# Patient Record
Sex: MALE | Race: WHITE | NOT HISPANIC OR LATINO | Employment: OTHER | URBAN - METROPOLITAN AREA
[De-identification: names, ages, dates, MRNs, and addresses within clinical notes are randomized per-mention and may not be internally consistent; named-entity substitution may affect disease eponyms.]

---

## 2020-10-12 ENCOUNTER — TELEPHONE (OUTPATIENT)
Dept: UROLOGY | Facility: CLINIC | Age: 50
End: 2020-10-12

## 2020-10-12 NOTE — TELEPHONE ENCOUNTER
"[facility] Call Center    Phone Message    May a detailed message be left on voicemail: yes     Reason for Call: Other: Klever saw Dr. Mims back in 2014 for hematuria he was having. Billie is not currently having hematuria, but he would like to follow up with an urologist to make sure everything is working \"down there\". Klever would be interested in taking one of the new patient slots for Dr. Rasheed on 11/25/20. Per clinic protocols, it is recommended an encounter be sent over for review in regards to hematuria. Please give Klever a call back at your earliest convenience to discuss.     Action Taken: Message routed to:  [facility]  U[facility]    Travel Screening: Not Applicable                                                                      "

## 2020-10-13 NOTE — TELEPHONE ENCOUNTER
[facility] Call Center    Phone Message    May a detailed message be left on voicemail: yes     Reason for Call: Other: Klever calling back again today (10/13/20) to check on the message that was left for him yesterday (10/12/20). Klever would like to get an update from the care team regarding seeing Dr. Rasheed. Please refer to the first encounter dated 10/12/20 for more details. Please give Klever a call back at your earliest convenience.     Action Taken: Message routed to:  [facility]  Uro    Travel Screening: Not Applicable

## 2020-10-14 NOTE — TELEPHONE ENCOUNTER
Spoke to patient. Scheduled visit with Dolores Holt on 11/24 in clinic as patient want to be examine.

## 2020-10-26 ENCOUNTER — DOCUMENTATION ONLY (OUTPATIENT)
Dept: CARE COORDINATION | Facility: CLINIC | Age: 50
End: 2020-10-26

## 2020-11-01 NOTE — TELEPHONE ENCOUNTER
MEDICAL RECORDS REQUEST   [facility]  Urology Clinic  [address] [place] [address]  PHONE: [phone]  Fax: [phone]        FUTURE VISIT INFORMATION                                                   Klever Wall, : 1970 scheduled for future visit at [facility]    APPOINTMENT INFORMATION:    Date: 20 8:30AM    Provider:  Dolores Holt RN    Reason for Visit/Diagnosis: Hematuria     REFERRAL INFORMATION:    Referring provider:  Self    Specialty: N/A    Referring providers clinic:  N/A    Clinic contact number:  N/A    RECORDS REQUESTED FOR VISIT                                                     NOTES  STATUS/DETAILS   OFFICE NOTE from referring provider  no   OFFICE NOTE from other specialist  no   DISCHARGE SUMMARY from hospital  no   DISCHARGE REPORT from the ER  no   OPERATIVE REPORT  no   MEDICATION LIST  no   LABS     URINALYSIS (UA)  yes     PRE-VISIT CHECKLIST      Record collection complete Yes   Appointment appropriately scheduled           (right time/right provider) Yes   MyChart activation If no, please explain: In process    Questionnaire complete If no, please explain: In process      Completed by: Libra Chaidez

## 2020-11-17 ENCOUNTER — PRE VISIT (OUTPATIENT)
Dept: UROLOGY | Facility: CLINIC | Age: 50
End: 2020-11-17

## 2020-11-17 NOTE — TELEPHONE ENCOUNTER
Reason for visit: hematuria work up    Relevant information: pt is self referred    Records/imaging/labs/orders: pt saw Dr. Mims in 2014    Pt called: no need for a call    At Rooming: collect a urine

## 2020-11-24 ENCOUNTER — VIRTUAL VISIT (OUTPATIENT)
Dept: UROLOGY | Facility: CLINIC | Age: 50
End: 2020-11-24
Payer: COMMERCIAL

## 2020-11-24 ENCOUNTER — PRE VISIT (OUTPATIENT)
Dept: UROLOGY | Facility: CLINIC | Age: 50
End: 2020-11-24

## 2020-11-24 DIAGNOSIS — Z87.448 HISTORY OF HEMATURIA: Primary | ICD-10-CM

## 2020-11-24 PROCEDURE — 99202 OFFICE O/P NEW SF 15 MIN: CPT | Mod: 95 | Performed by: NURSE PRACTITIONER

## 2020-11-24 ASSESSMENT — PAIN SCALES - GENERAL: PAINLEVEL: NO PAIN (0)

## 2020-11-24 NOTE — PROGRESS NOTES
"Klever Wall is a 50 year old male who is being evaluated via a billable video visit.      The patient has been notified of following:     \"This video visit will be conducted via a call between you and your physician/provider. We have found that certain health care needs can be provided without the need for an in-person physical exam.  This service lets us provide the care you need with a video conversation.  If a prescription is necessary we can send it directly to your pharmacy.  If lab work is needed we can place an order for that and you can then stop by our lab to have the test done at a later time.    Video visits are billed at different rates depending on your insurance coverage.  Please reach out to your insurance provider with any questions.    If during the course of the call the physician/provider feels a video visit is not appropriate, you will not be charged for this service.\"    Patient has given verbal consent for Video visit? Yes  How would you like to obtain your AVS? Mail a copy  If you are dropped from the video visit, the video invite should be resent to: Send to e-mail at: [email or web address]  Will anyone else be joining your video visit? No      Video Visit Technology for this patient: Omn[employer] Video Visit- Patient was left in waiting room      Klever Wall complains of   Chief Complaint   Patient presents with     Consult     Microscopic hematuria work up       50 year old male who presents today via virtual visit regarding hematuria. Was previously evaluated by Dr. Mims in 2014 for microscopic hematuria. Workup at that time negative; therefore, cystoscopy was deferred.     Today, he states that he needs an updated physical for the [employer], as he is a recreational . As a part of this, he needs to be reevaluated by urology for his history of microhematuria. States that his UAs always show a \"trace\" of blood. Denies any urinary issues, speccificallydysuria, pyuria, hesitancy, " intermittency, feelings of incomplete emptying, or any recent history of urinary tract infections or stones. Denies any gross hematuria.      Exam:   Patient is a 50 year old male   General Appearance: Well groomed, hygenic  Respiratory: No cough, no respiratory distress or labored breathing  Musculoskeletal: Grossly normal with no gross deficits  Skin: No discoloration or apparent rashes  Neurologic: No tremors  Psychiatric: Alert and oriented  Further examination is deferred due to the nature of our visit.       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 Denies any gross hematuria or urinary complaints.  -Will update a UA/UC, as well as a urine cytology.  -If UA negative, will defer further workup. If positive, could pursue urinary tract imaging.           Video-Visit Details    Type of service:  Video Visit    Video Start Time: 8:35 PM  Video End Time: 8:50 AM    Originating Location (pt. Location): Home    Distant Location (provider location):  [facility] UROLOG[facility] CLINIC [place]     Platform used for Video Visit: Liam Holt, CNP

## 2020-11-24 NOTE — PATIENT INSTRUCTIONS
UROLOGY CLINIC VISIT PATIENT INSTRUCTIONS    -Will update a UA/UC and urine cytology at the lab.  -I will be in contact with you regarding results, once complete.    If you have any issues, questions or concerns in the meantime, do not hesitate to contact us at [phone] or via Little1.     It was a pleasure meeting with you today.  Thank you for allowing me and my team the privilege of caring for you today.  YOU are the reason we are here, and I truly hope we provided you with the excellent service you deserve.  Please let us know if there is anything else we can do for you so that we can be sure you are leaving completely satisfied with your care experience.    Dolores Holt, CNP

## 2020-11-24 NOTE — NURSING NOTE
Chief Complaint   Patient presents with     Consult     Microscopic hematuria work up       There were no vitals taken for this visit. There is no height or weight on file to calculate BMI.    Patient Active Problem List   Diagnosis     Microscopic hematuria       No Known Allergies    No current outpatient medications on file.       Social History     Tobacco Use     Smoking status: Never Smoker     Smokeless tobacco: Former User   Substance Use Topics     Alcohol use: None     Drug use: None       Carie Toledo CMA  11/24/2020  8:31 AM

## 2020-11-24 NOTE — LETTER
"11/24/2020       RE: Klever Wall  [address] [address]     Dear Colleague,    Thank you for referring your patient, Klever Wall, to the [facility] [place] [facility]. Please see a copy of my visit note below.    Klever Wall is a 50 year old male who is being evaluated via a billable video visit.      The patient has been notified of following:     \"This video visit will be conducted via a call between you and your physician/provider. We have found that certain health care needs can be provided without the need for an in-person physical exam.  This service lets us provide the care you need with a video conversation.  If a prescription is necessary we can send it directly to your pharmacy.  If lab work is needed we can place an order for that and you can then stop by our lab to have the test done at a later time.    Video visits are billed at different rates depending on your insurance coverage.  Please reach out to your insurance provider with any questions.    If during the course of the call the physician/provider feels a video visit is not appropriate, you will not be charged for this service.\"    Patient has given verbal consent for Video visit? Yes  How would you like to obtain your AVS? Mail a copy  If you are dropped from the video visit, the video invite should be resent to: Send to e-mail at: [email or web address]  Will anyone else be joining your video visit? No      Video Visit Technology for this patient: [employer] Video Visit- Patient was left in waiting room      Klever Wall complains of   Chief Complaint   Patient presents with     Consult     Microscopic hematuria work up       50 year old male who presents today via virtual visit regarding hematuria. Was previously evaluated by Dr. Mims in 2014 for microscopic hematuria. Workup at that time negative; therefore, cystoscopy was deferred. 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 As a part of this, he needs to be reevaluated by urology for his history of microhematuria. States that his UAs always show a \"trace\" of blood. Denies any urinary issues, speccificallydysuria, pyuria, hesitancy, intermittency, feelings of incomplete emptying, or any recent history of urinary tract infections or stones. Denies any gross hematuria.      Exam:   Patient is a 50 year old male   General Appearance: Well groomed, hygenic  Respiratory: No cough, no respiratory distress or labored breathing  Musculoskeletal: Grossly normal with no gross deficits  Skin: No discoloration or apparent rashes  Neurologic: No tremors  Psychiatric: Alert and oriented  Further examination is deferred due to the nature of our visit.       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 Denies any gross hematuria or urinary complaints.  -Will update a UA/UC, as well as a urine cytology.  -If UA negative, will defer further workup. If positive, could pursue urinary tract imaging.           Video-Visit Details    Type of service:  Video Visit    Video Start Time: 8:35 PM  Video End Time: 8:50 AM    Originating Location (pt. Location): Home    Distant Location (provider location):  [facility] [place]     Platform used for Video Visit: Liam Hotl, CNP  "

## 2020-11-25 ENCOUNTER — TELEPHONE (OUTPATIENT)
Dept: UROLOGY | Facility: CLINIC | Age: 50
End: 2020-11-25

## 2020-11-25 NOTE — TELEPHONE ENCOUNTER
Called Edwin who stated that they were choosing not to complete UA/UC or cytology. Patient stated that they did not desire to continue workup for their hematuria at this time. I assured patient that he would be going against his provider's professional recommendation as well as AUA guidelines for hematuria. Patient stated their understanding. Orders for UA/UC and cytology will stay valid for one year if patient wishes to continue care. This information was shared with patient.    Joe Merchant, EMT

## 2023-03-30 ENCOUNTER — OFFICE VISIT (OUTPATIENT)
Dept: CARDIOLOGY | Facility: CLINIC | Age: 53
End: 2023-03-30
Payer: COMMERCIAL

## 2023-03-30 ENCOUNTER — LAB (OUTPATIENT)
Dept: LAB | Facility: CLINIC | Age: 53
End: 2023-03-30
Payer: COMMERCIAL

## 2023-03-30 VITALS
HEIGHT: 70 IN | HEART RATE: 76 BPM | SYSTOLIC BLOOD PRESSURE: 130 MMHG | DIASTOLIC BLOOD PRESSURE: 72 MMHG | OXYGEN SATURATION: 94 % | WEIGHT: 173.6 LBS | BODY MASS INDEX: 24.85 KG/M2

## 2023-03-30 DIAGNOSIS — Z82.49 FAMILY HISTORY OF ISCHEMIC HEART DISEASE: ICD-10-CM

## 2023-03-30 DIAGNOSIS — R06.02 SHORTNESS OF BREATH ON EXERTION: ICD-10-CM

## 2023-03-30 DIAGNOSIS — Z82.49 FAMILY HISTORY OF ISCHEMIC HEART DISEASE: Primary | ICD-10-CM

## 2023-03-30 DIAGNOSIS — Z82.49 FAMILY HISTORY OF VALVULAR HEART DISEASE: ICD-10-CM

## 2023-03-30 LAB
ANION GAP SERPL CALCULATED.3IONS-SCNC: 9 MMOL/L (ref 7–15)
BUN SERPL-MCNC: 7.2 MG/DL (ref 6–20)
CALCIUM SERPL-MCNC: 9.5 MG/DL (ref 8.6–10)
CHLORIDE SERPL-SCNC: 104 MMOL/L (ref 98–107)
CHOLEST SERPL-MCNC: 208 MG/DL
CREAT SERPL-MCNC: 0.87 MG/DL (ref 0.67–1.17)
DEPRECATED HCO3 PLAS-SCNC: 27 MMOL/L (ref 22–29)
GFR SERPL CREATININE-BSD FRML MDRD: >90 ML/MIN/1.73M2
GLUCOSE SERPL-MCNC: 100 MG/DL (ref 70–99)
HDLC SERPL-MCNC: 69 MG/DL
LDLC SERPL CALC-MCNC: 124 MG/DL
NONHDLC SERPL-MCNC: 139 MG/DL
POTASSIUM SERPL-SCNC: 4.5 MMOL/L (ref 3.4–5.3)
SODIUM SERPL-SCNC: 140 MMOL/L (ref 136–145)
TRIGL SERPL-MCNC: 76 MG/DL

## 2023-03-30 PROCEDURE — 36415 COLL VENOUS BLD VENIPUNCTURE: CPT | Performed by: INTERNAL MEDICINE

## 2023-03-30 PROCEDURE — 99204 OFFICE O/P NEW MOD 45 MIN: CPT | Performed by: INTERNAL MEDICINE

## 2023-03-30 PROCEDURE — 80048 BASIC METABOLIC PNL TOTAL CA: CPT | Performed by: INTERNAL MEDICINE

## 2023-03-30 PROCEDURE — 80061 LIPID PANEL: CPT | Performed by: INTERNAL MEDICINE

## 2023-03-30 PROCEDURE — 93000 ELECTROCARDIOGRAM COMPLETE: CPT | Performed by: INTERNAL MEDICINE

## 2023-03-30 NOTE — PROGRESS NOTES
"HPI and Plan:   See dictation          Orders Placed This Encounter   Procedures     Lipid Profile     Basic metabolic panel     Follow-Up with Cardiology     EKG 12-lead complete w/read - Clinics (performed today)     Stress Test - Adult     Echocardiogram Complete       No orders of the defined types were placed in this encounter.      There are no discontinued medications.      Encounter Diagnoses   Name Primary?     Family history of ischemic heart disease Yes     Shortness of breath on exertion      Family history of valvular heart disease        CURRENT MEDICATIONS:  No current outpatient medications on file.       ALLERGIES   No Known Allergies    PAST MEDICAL HISTORY:  History reviewed. No pertinent past medical history.    PAST SURGICAL HISTORY:  History reviewed. No pertinent surgical history.    FAMILY HISTORY:  Family History   Problem Relation Age of Onset     Obesity Mother      Colon Cancer Maternal Grandfather      Heart Murmur Brother        SOCIAL HISTORY:  Social History     Socioeconomic History     Marital status: Single     Spouse name: None     Number of children: None     Years of education: None     Highest education level: None   Tobacco Use     Smoking status: Never     Smokeless tobacco: Former     Quit date: 1992   Substance and Sexual Activity     Alcohol use: Yes     Comment: occ       Review of Systems:  Skin:          Eyes:         ENT:         Respiratory:          Cardiovascular:         Gastroenterology:        Genitourinary:         Musculoskeletal:         Neurologic:         Psychiatric:         Heme/Lymph/Imm:         Endocrine:           Physical Exam:  Vitals: /72   Pulse 76   Ht 1.778 m (5' 10\")   Wt 78.7 kg (173 lb 9.6 oz)   SpO2 94%   BMI 24.91 kg/m      Constitutional:  cooperative, alert and oriented, well developed, well nourished, in no acute distress thin      Skin:  warm and dry to the touch          Head:  no masses or lesions        Eyes:  pupils equal " and round        Lymph:No Cervical lymphadenopathy present     ENT:  no pallor or cyanosis        Neck:  carotid pulses are full and equal bilaterally, JVP normal, no carotid bruit        Respiratory:  normal breath sounds, clear to auscultation, normal A-P diameter, normal symmetry, normal respiratory excursion, no use of accessory muscles         Cardiac: regular rhythm;normal S1 and S2;no S3 or S4;no murmurs, gallops or rubs detected                pulses full and equal, no bruits auscultated                                        GI:  not assessed this visit        Extremities and Muscular Skeletal:  no deformities, clubbing, cyanosis, erythema observed;no edema              Neurological:  no gross motor deficits;affect appropriate        Psych:  Alert and Oriented x 3        CC  Referred Self, MD  No address on file

## 2023-03-30 NOTE — LETTER
"3/30/2023    Physician No Ref-Primary  No address on file    RE: Klever Wall       Dear Colleague,     I had the pleasure of seeing Klever Wall in the [facility].  HPI and Plan:   See dictation          Orders Placed This Encounter   Procedures     Lipid Profile     Basic metabolic panel     Follow-Up with Cardiology     EKG 12-lead complete w/read - Clinics (performed today)     Stress Test - Adult     Echocardiogram Complete       No orders of the defined types were placed in this encounter.      There are no discontinued medications.      Encounter Diagnoses   Name Primary?     Family history of ischemic heart disease Yes     Shortness of breath on exertion      Family history of valvular heart disease        CURRENT MEDICATIONS:  No current outpatient medications on file.       ALLERGIES   No Known Allergies    PAST MEDICAL HISTORY:  History reviewed. No pertinent past medical history.    PAST SURGICAL HISTORY:  History reviewed. No pertinent surgical history.    FAMILY HISTORY:  Family History   Problem Relation Age of Onset     Obesity Mother      Colon Cancer Maternal Grandfather      Heart Murmur Brother        SOCIAL HISTORY:  Social History     Socioeconomic History     Marital status: Single     Spouse name: None     Number of children: None     Years of education: None     Highest education level: None   Tobacco Use     Smoking status: Never     Smokeless tobacco: Former     Quit date: 1992   Substance and Sexual Activity     Alcohol use: Yes     Comment: occ       Review of Systems:  Skin:          Eyes:         ENT:         Respiratory:          Cardiovascular:         Gastroenterology:        Genitourinary:         Musculoskeletal:         Neurologic:         Psychiatric:         Heme/Lymph/Imm:         Endocrine:           Physical Exam:  Vitals: /72   Pulse 76   Ht 1.778 m (5' 10\")   Wt 78.7 kg (173 lb 9.6 oz)   SpO2 94%   BMI 24.91 kg/m  "     Constitutional:  cooperative, alert and oriented, well developed, well nourished, in no acute distress thin      Skin:  warm and dry to the touch          Head:  no masses or lesions        Eyes:  pupils equal and round        Lymph:No Cervical lymphadenopathy present     ENT:  no pallor or cyanosis        Neck:  carotid pulses are full and equal bilaterally, JVP normal, no carotid bruit        Respiratory:  normal breath sounds, clear to auscultation, normal A-P diameter, normal symmetry, normal respiratory excursion, no use of accessory muscles         Cardiac: regular rhythm;normal S1 and S2;no S3 or S4;no murmurs, gallops or rubs detected                pulses full and equal, no bruits auscultated                                        GI:  not assessed this visit        Extremities and Muscular Skeletal:  no deformities, clubbing, cyanosis, erythema observed;no edema              Neurological:  no gross motor deficits;affect appropriate        Psych:  Alert and Oriented x 3        CC  Referred Self, MD  No address on file                  Thank you for allowing me to participate in the care of your patient.      Sincerely,     Alvaro Junior MD, MD     [facility]  cc:   Erinn Cheney MD  No address on file

## 2023-03-30 NOTE — LETTER
Date:March 31, 2023      Provider requested that no letter be sent. Do not send.       [facility]

## 2023-03-30 NOTE — PROGRESS NOTES
Service Date: 03/30/2023    INDICATION FOR REFERRAL:  Brother has a history of valvular heart disease with his valvular disease has been diagnosed in his 20s and having had valve replacement in his early 40s.  There is possibly a history of coronary artery disease and also shortness of breath with exertion.      HISTORY OF PRESENT ILLNESS:  Mr. Moss is a pleasant 53-year-old .  He tries to work out as much as he can.  He does a lot of weight working, but he has recently noticed over the last few months that when his bench pressing that he has become a lot more short of breath and has also become quite dizzy when he comes off the bench pressing machine.  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  In the past, this would not cause difficulty, but as I said in the last 2 months, he has clearly noticed a significant change in his symptoms.  He has not noticed chest pains or chest pressure or but mainly shortness of breath and also being quite dizzy and presyncopal.  He was sufficiently worried about this that he stopped running, which he really liked to do, because he is afraid of the symptoms that he has developed while working out on the weight machines.      His 12-lead electrocardiogram is within normal limits.  His blood pressure is well controlled at 130/72.  I do not see anywhere that he has had a lipid profile checked.  He has never had an echocardiogram despite the family history of premature valvular heart disease.      With respect to his family history of valvular heart disease, evidently his brother was diagnosed with valvular heart disease when he was quite young and maybe in his teens or 20s, and he ended up 20 years later having valve replacement.  He is not sure what valve it was, but I would suspect that his brother probably had a bicuspid aortic valve, most likely, although I cannot be 100% sure of that.    IMPRESSION:    1.  Shortness of breath on exertion.  Certainly, ischemia does need  to be ruled out as well as valvular heart disease.  2.  Family history of premature valve replacement in his brother who appears to have had valvular heart disease from his teens or early 20s and having the valve replaced in his early 40s.  3.  Normotensive.    PLAN:  Firstly, we will obtain a stress EKG to determine if there is any evidence of ischemia.      Secondly, this patient should have an echocardiogram given the family history of valvular heart disease with premature valve replacement, which almost certainly represents congenital valvular heart disease, of which most likely would be bicuspid aortic valve.  We certainly need to know if this patient has a bicuspid aortic valve as this can also be associated with aortopathy and certainly we do not want a patient bench pressing heavy weights with an aortopathy so that needs to be ruled out.    Finally, we will obtain a fasting lipid profile.      I will see the patient back again with the results of these tests.    It is my pleasure to be involved the care of this nice patient.    MD Alvaro Costa MD, [facility]        D: 2023   T: 2023   MT: MALIK    Name:     ULISSES ZHU  MRN:      [id number]        Account:      [id number]   :      1970           Service Date: 2023       Document: [id number]

## 2023-03-31 ENCOUNTER — HOSPITAL ENCOUNTER (OUTPATIENT)
Dept: CARDIOLOGY | Facility: CLINIC | Age: 53
Discharge: HOME | End: 2023-03-31
Attending: INTERNAL MEDICINE | Admitting: INTERNAL MEDICINE
Payer: COMMERCIAL

## 2023-03-31 DIAGNOSIS — Z82.49 FAMILY HISTORY OF VALVULAR HEART DISEASE: ICD-10-CM

## 2023-03-31 LAB — LVEF ECHO: NORMAL

## 2023-03-31 PROCEDURE — 93306 TTE W/DOPPLER COMPLETE: CPT

## 2023-03-31 PROCEDURE — 93306 TTE W/DOPPLER COMPLETE: CPT | Mod: 26 | Performed by: INTERNAL MEDICINE

## 2023-04-11 ENCOUNTER — TELEPHONE (OUTPATIENT)
Dept: CARDIOLOGY | Facility: CLINIC | Age: 53
End: 2023-04-11
Payer: COMMERCIAL

## 2023-04-11 NOTE — TELEPHONE ENCOUNTER
[facility] Call Center    Phone Message    May a detailed message be left on voicemail: yes     Reason for Call: Other: Pt would like a call back asap as he was laying in bed last night and felt his heart beat was pounding in his chest which felt a little unusual and felt possibly slower then normal and he is concerned and would like to discuss and he also stated his heart beat was a little high this morning     Action Taken: Message routed to:  [facility] Cardio    Travel Screening: Not Applicable

## 2023-04-17 NOTE — TELEPHONE ENCOUNTER
Patient called back, states he does not know if the symptoms he is getting is due to stress or if there is something going on with his heart.  Patient states his heart is not beating fast when he gets these palpitations but that they are beating stronger and that he is seems to be able to feel them more.  Patient does complain of rare chest pressure with this, but denies lightheadedness.  Symptoms occur more at rest and states he does not get the symptoms when he is up and about.  Patient states his heart rate is usually slow running 55 to 60 bpm.  Patient will get upcoming stress test and will follow-up based on these results.  Savage RICHARDSON

## 2023-04-24 ENCOUNTER — HOSPITAL ENCOUNTER (OUTPATIENT)
Dept: CARDIOLOGY | Facility: CLINIC | Age: 53
Discharge: HOME | End: 2023-04-24
Attending: INTERNAL MEDICINE
Payer: COMMERCIAL

## 2023-04-24 DIAGNOSIS — R06.02 SHORTNESS OF BREATH ON EXERTION: ICD-10-CM

## 2023-04-28 ENCOUNTER — OFFICE VISIT (OUTPATIENT)
Dept: CARDIOLOGY | Facility: CLINIC | Age: 53
End: 2023-04-28
Attending: INTERNAL MEDICINE
Payer: COMMERCIAL

## 2023-04-28 VITALS
BODY MASS INDEX: 24.15 KG/M2 | DIASTOLIC BLOOD PRESSURE: 80 MMHG | HEART RATE: 68 BPM | SYSTOLIC BLOOD PRESSURE: 126 MMHG | HEIGHT: 70 IN | OXYGEN SATURATION: 95 % | WEIGHT: 168.7 LBS

## 2023-04-28 DIAGNOSIS — R06.02 SHORTNESS OF BREATH ON EXERTION: ICD-10-CM

## 2023-04-28 DIAGNOSIS — Z82.49 FAMILY HISTORY OF VALVULAR HEART DISEASE: ICD-10-CM

## 2023-04-28 DIAGNOSIS — Z82.49 FAMILY HISTORY OF ISCHEMIC HEART DISEASE: ICD-10-CM

## 2023-04-28 PROCEDURE — 99213 OFFICE O/P EST LOW 20 MIN: CPT | Performed by: INTERNAL MEDICINE

## 2023-04-28 NOTE — PROGRESS NOTES
"HPI and Plan:   See dictation        No orders of the defined types were placed in this encounter.      No orders of the defined types were placed in this encounter.      There are no discontinued medications.      Encounter Diagnoses   Name Primary?     Family history of ischemic heart disease      Shortness of breath on exertion      Family history of valvular heart disease        CURRENT MEDICATIONS:  No current outpatient medications on file.       ALLERGIES   No Known Allergies    PAST MEDICAL HISTORY:  History reviewed. No pertinent past medical history.    PAST SURGICAL HISTORY:  History reviewed. No pertinent surgical history.    FAMILY HISTORY:  Family History   Problem Relation Age of Onset     Obesity Mother      Colon Cancer Maternal Grandfather      Heart Murmur Brother        SOCIAL HISTORY:  Social History     Socioeconomic History     Marital status: Single     Spouse name: None     Number of children: None     Years of education: None     Highest education level: None   Tobacco Use     Smoking status: Never     Smokeless tobacco: Former     Types: Chew     Quit date: 1992   Substance and Sexual Activity     Alcohol use: Yes     Comment: occ       Review of Systems:  Skin:          Eyes:         ENT:         Respiratory:          Cardiovascular:         Gastroenterology:        Genitourinary:         Musculoskeletal:         Neurologic:         Psychiatric:         Heme/Lymph/Imm:         Endocrine:           Physical Exam:  Vitals: /80   Pulse 68   Ht 1.778 m (5' 10\")   Wt 76.5 kg (168 lb 11.2 oz)   SpO2 95%   BMI 24.21 kg/m          CC  Alvaro Junior MD  [address]  [place] [id number]              "

## 2023-04-28 NOTE — LETTER
"4/28/2023    Physician No Ref-Primary  No address on file    RE: Klever Wall       Dear Colleague,     I had the pleasure of seeing Klever Wall in the [facility].  HPI and Plan:   See dictation        No orders of the defined types were placed in this encounter.      No orders of the defined types were placed in this encounter.      There are no discontinued medications.      Encounter Diagnoses   Name Primary?    Family history of ischemic heart disease     Shortness of breath on exertion     Family history of valvular heart disease        CURRENT MEDICATIONS:  No current outpatient medications on file.       ALLERGIES   No Known Allergies    PAST MEDICAL HISTORY:  History reviewed. No pertinent past medical history.    PAST SURGICAL HISTORY:  History reviewed. No pertinent surgical history.    FAMILY HISTORY:  Family History   Problem Relation Age of Onset    Obesity Mother     Colon Cancer Maternal Grandfather     Heart Murmur Brother        SOCIAL HISTORY:  Social History     Socioeconomic History    Marital status: Single     Spouse name: None    Number of children: None    Years of education: None    Highest education level: None   Tobacco Use    Smoking status: Never    Smokeless tobacco: Former     Types: Chew     Quit date: 1992   Substance and Sexual Activity    Alcohol use: Yes     Comment: occ       Review of Systems:  Skin:          Eyes:         ENT:         Respiratory:          Cardiovascular:         Gastroenterology:        Genitourinary:         Musculoskeletal:         Neurologic:         Psychiatric:         Heme/Lymph/Imm:         Endocrine:           Physical Exam:  Vitals: /80   Pulse 68   Ht 1.778 m (5' 10\")   Wt 76.5 kg (168 lb 11.2 oz)   SpO2 95%   BMI 24.21 kg/m            CC  Alvaro Junior MD  [address]  [place] [address]                Service Date: 04/28/2023    CARDIOLOGY FOLLOWUP VISIT    REFERRING PHYSICIAN:  I do " not see a referring physician.    HISTORY OF PRESENT ILLNESS:  Klever Wall is a very pleasant 53-year-old patient whose brother has had a history of valvular heart disease and possibly a history of coronary artery disease also.  This patient has been complaining of occasionally having shortness of breath on exertion.  I would refer you to my dictation from 4 weeks ago on 03/30/2023.  As part of our workup, we had the patient perform a stress EKG which was entirely normal.  The stress EKG showed no evidence of ischemia.  He was able to exercise up to 12 minutes which is excellent workload.  No arrhythmias and no chest discomfort were noted.  His Duke score was entirely normal at 12 which is low risk.  Echocardiography, firstly, showed that he has a tricuspid aortic valve, so he has not inherited his brother's bicuspid valve.  Also, he has no significant valvular heart disease and I did explain to him that trace of mitral regurgitation and mild tricuspid regurgitation is probably within normal limits.  His ejection fraction was normal.  He was wondering why his ejection fraction was not 100% and I explained to him that 55 and above is fully normal.  His diastolic function is also normal.  Pressures in the right side of the heart could not be measured.  His filling pressures are all normal.  TAPSE is also normal.  So, it does appear that the patient, firstly, has not inherited a bicuspid aortic valve and there is no evidence of ischemia at all.  His chest discomfort also did sound atypical.    IMPRESSION:    1.  No evidence of a bicuspid aortic valve.  Aortic valve is clearly tricuspid.  2.  Chest discomfort.  No evidence of ischemia on stress testing.    PLAN:  At this particular time, I would just observe and he can see us back on a p.r.n. basis.  I reassured the patient that he was doing well and that the probability of serious cardiac issues is low.    Alvaro Trotter MD, [facility]        D: 04/28/2023   T:  2023   MT: [email or web address]    Name:     ULISSES ZHU  MRN:      [id number]        Account:      [id number]   :      1970           Service Date: 2023       Document: [id number]    Thank you for allowing me to participate in the care of your patient.      Sincerely,     Alvaro Junior MD, MD     [facility]  cc:   Alvaro Junior MD  [address] [facility]  [place] [address]

## 2023-04-28 NOTE — PROGRESS NOTES
Service Date: 04/28/2023    CARDIOLOGY FOLLOWUP VISIT    REFERRING PHYSICIAN:  I do not see a referring physician.    HISTORY OF PRESENT ILLNESS:  Klever Wall is a very pleasant 53-year-old patient whose brother has had a history of valvular heart disease and possibly a history of coronary artery disease also.  This patient has been complaining of occasionally having shortness of breath on exertion.  I would refer you to my dictation from 4 weeks ago on 03/30/2023.  As part of our workup, we had the patient perform a stress EKG which was entirely normal.  The stress EKG showed no evidence of ischemia.  He was able to exercise up to 12 minutes which is excellent workload.  No arrhythmias and no chest discomfort were noted.  His Duke score was entirely normal at 12 which is low risk.  Echocardiography, firstly, showed that he has a tricuspid aortic valve, so he has not inherited his brother's bicuspid valve.  Also, he has no significant valvular heart disease and I did explain to him that trace of mitral regurgitation and mild tricuspid regurgitation is probably within normal limits.  His ejection fraction was normal.  He was wondering why his ejection fraction was not 100% and I explained to him that 55 and above is fully normal.  His diastolic function is also normal.  Pressures in the right side of the heart could not be measured.  His filling pressures are all normal.  TAPSE is also normal.  So, it does appear that the patient, firstly, has not inherited a bicuspid aortic valve and there is no evidence of ischemia at all.  His chest discomfort also did sound atypical.    IMPRESSION:    1.  No evidence of a bicuspid aortic valve.  Aortic valve is clearly tricuspid.  2.  Chest discomfort.  No evidence of ischemia on stress testing.    PLAN:  At this particular time, I would just observe and he can see us back on a p.r.n. basis.  I reassured the patient that he was doing well and that the probability of serious  cardiac issues is low.    Alvaro Trotter MD, [facility]        D: 2023   T: 2023   MT: jorge    Name:     ULISSES ZHU  MRN:      [id number]        Account:      [id number]   :      1970           Service Date: 2023       Document: [id number]

## 2023-05-10 ENCOUNTER — OFFICE VISIT (OUTPATIENT)
Dept: FAMILY MEDICINE | Facility: CLINIC | Age: 53
End: 2023-05-10
Payer: COMMERCIAL

## 2023-05-10 VITALS
TEMPERATURE: 98.2 F | OXYGEN SATURATION: 96 % | HEIGHT: 70 IN | WEIGHT: 169 LBS | SYSTOLIC BLOOD PRESSURE: 123 MMHG | RESPIRATION RATE: 20 BRPM | BODY MASS INDEX: 24.2 KG/M2 | DIASTOLIC BLOOD PRESSURE: 79 MMHG | HEART RATE: 61 BPM

## 2023-05-10 DIAGNOSIS — R00.2 PALPITATIONS: ICD-10-CM

## 2023-05-10 DIAGNOSIS — Z00.00 ROUTINE GENERAL MEDICAL EXAMINATION AT A HEALTH CARE FACILITY: Primary | ICD-10-CM

## 2023-05-10 DIAGNOSIS — N52.9 ERECTILE DYSFUNCTION, UNSPECIFIED ERECTILE DYSFUNCTION TYPE: ICD-10-CM

## 2023-05-10 LAB — HBA1C MFR BLD: 5.3 % (ref 0–5.6)

## 2023-05-10 PROCEDURE — 86803 HEPATITIS C AB TEST: CPT | Performed by: STUDENT IN AN ORGANIZED HEALTH CARE EDUCATION/TRAINING PROGRAM

## 2023-05-10 PROCEDURE — 36415 COLL VENOUS BLD VENIPUNCTURE: CPT | Performed by: STUDENT IN AN ORGANIZED HEALTH CARE EDUCATION/TRAINING PROGRAM

## 2023-05-10 PROCEDURE — 86780 TREPONEMA PALLIDUM: CPT | Performed by: STUDENT IN AN ORGANIZED HEALTH CARE EDUCATION/TRAINING PROGRAM

## 2023-05-10 PROCEDURE — 99214 OFFICE O/P EST MOD 30 MIN: CPT | Mod: 25 | Performed by: STUDENT IN AN ORGANIZED HEALTH CARE EDUCATION/TRAINING PROGRAM

## 2023-05-10 PROCEDURE — 87591 N.GONORRHOEAE DNA AMP PROB: CPT | Performed by: STUDENT IN AN ORGANIZED HEALTH CARE EDUCATION/TRAINING PROGRAM

## 2023-05-10 PROCEDURE — 99386 PREV VISIT NEW AGE 40-64: CPT | Mod: GC | Performed by: STUDENT IN AN ORGANIZED HEALTH CARE EDUCATION/TRAINING PROGRAM

## 2023-05-10 PROCEDURE — 83036 HEMOGLOBIN GLYCOSYLATED A1C: CPT | Performed by: STUDENT IN AN ORGANIZED HEALTH CARE EDUCATION/TRAINING PROGRAM

## 2023-05-10 PROCEDURE — 87491 CHLMYD TRACH DNA AMP PROBE: CPT | Performed by: STUDENT IN AN ORGANIZED HEALTH CARE EDUCATION/TRAINING PROGRAM

## 2023-05-10 PROCEDURE — 87389 HIV-1 AG W/HIV-1&-2 AB AG IA: CPT | Performed by: STUDENT IN AN ORGANIZED HEALTH CARE EDUCATION/TRAINING PROGRAM

## 2023-05-10 RX ORDER — SILDENAFIL 100 MG/1
50 TABLET, FILM COATED ORAL DAILY PRN
Qty: 30 TABLET | Refills: 1 | Status: SHIPPED | OUTPATIENT
Start: 2023-05-10 | End: 2024-01-25

## 2023-05-10 RX ORDER — SILDENAFIL 100 MG/1
50 TABLET, FILM COATED ORAL DAILY PRN
Qty: 30 TABLET | Refills: 1 | Status: SHIPPED | OUTPATIENT
Start: 2023-05-10 | End: 2023-05-10

## 2023-05-10 ASSESSMENT — ENCOUNTER SYMPTOMS
HEARTBURN: 1
NERVOUS/ANXIOUS: 1
WEAKNESS: 0
JOINT SWELLING: 0
HEMATOCHEZIA: 0
HEMATURIA: 0
NAUSEA: 0
DIZZINESS: 0
ABDOMINAL PAIN: 0
CONSTIPATION: 0
EYE PAIN: 0
PALPITATIONS: 1
CHILLS: 0
HEADACHES: 0
MYALGIAS: 0
PARESTHESIAS: 0
FREQUENCY: 0
FEVER: 0
ARTHRALGIAS: 0
DIARRHEA: 0
SHORTNESS OF BREATH: 1
DYSURIA: 0
COUGH: 0
SORE THROAT: 0

## 2023-05-10 NOTE — PROGRESS NOTES
Preceptor Attestation:    I discussed the patient with the resident and evaluated the patient in person. I have verified the content of the note, which accurately reflects my assessment of the patient and the plan of care.   Supervising Physician:  Minesh Maurer MD.

## 2023-05-10 NOTE — PROGRESS NOTES
SUBJECTIVE:   CC: Klever is an 53 year old who presents for preventative health visit.       5/10/2023    12:39 PM   Additional Questions   Roomed by clyde   Accompanied by self        Patient has been advised of split billing requirements and indicates understanding: Yes     Healthy Habits:     Getting at least 3 servings of Calcium per day:  Yes    Bi-annual eye exam:  Yes    Dental care twice a year:  NO    Sleep apnea or symptoms of sleep apnea:  None    Diet:  Regular (no restrictions)    Frequency of exercise:  4-5 days/week    Duration of exercise:  15-30 minutes    Taking medications regularly:  Yes    Medication side effects:  None    PHQ-2 Total Score: 0    Additional concerns today:  Yes    Erectile Dysfunction:  Onset: chronic, years  Difficulty obtaining erection with partner: yes  Difficulty obtaining erection during masturbation or sleep: declined  Difficulty sustaining erection: yes - chief issue  Difficulty ejaculating: no  Known history of:  Cardiovascular disease: no; had negative stress test & echo recently; only mildly elevated lipids  Diabetes: no  Tobacco use: no  Endocrine disorder: no  Neurologic disorder: no  Other genital disorder: no  Psychiatric condition: some anxiety  Notable medications & recent changes: none    Today's PHQ-2 Score:       5/10/2023    12:40 PM   PHQ-2 ( 1999 [employer])   Q1: Little interest or pleasure in doing things 0   Q2: Feeling down, depressed or hopeless 0   PHQ-2 Score 0     Have you ever done Advance Care Planning? (For example, a Health Directive, POLST, or a discussion with a medical provider or your loved ones about your wishes): No, advance care planning information given to patient to review.  Advanced care planning was discussed at today's visit.    Social History     Tobacco Use     Smoking status: Never     Smokeless tobacco: Former     Types: Chew     Quit date: 1992   Vaping Use     Vaping status: Not on file   Substance Use Topics     Alcohol use: Yes  "    Comment: occ             5/10/2023    12:34 PM   Alcohol Use   Prescreen: >3 drinks/day or >7 drinks/week? No       Last PSA: No results found for: PSA    Reviewed orders with patient. Reviewed health maintenance and updated orders accordingly - Yes  Labs reviewed in EPIC    Reviewed and updated as needed this visit by clinical staff   Tobacco                Reviewed and updated as needed this visit by Provider                   Review of Systems   Constitutional: Negative for chills and fever.   HENT: Negative for congestion, ear pain, hearing loss and sore throat.    Eyes: Negative for pain and visual disturbance.   Respiratory: Positive for shortness of breath. Negative for cough.    Cardiovascular: Positive for palpitations. Negative for chest pain and peripheral edema.   Gastrointestinal: Positive for heartburn. Negative for abdominal pain, constipation, diarrhea, hematochezia and nausea.   Genitourinary: Negative for dysuria, frequency, genital sores, hematuria and urgency.   Musculoskeletal: Negative for arthralgias, joint swelling and myalgias.   Skin: Negative for rash.   Neurological: Negative for dizziness, weakness, headaches and paresthesias.   Psychiatric/Behavioral: Negative for mood changes. The patient is nervous/anxious.        OBJECTIVE:   /79 (BP Location: Right arm, Patient Position: Sitting, Cuff Size: Adult Regular)   Pulse 61   Temp 98.2  F (36.8  C) (Oral)   Resp 20   Ht 1.778 m (5' 10\")   Wt 76.7 kg (169 lb)   SpO2 96%   BMI 24.25 kg/m      Physical Exam  GENERAL: healthy, alert and no distress  EYES: Eyes grossly normal to inspection, PERRL and conjunctivae and sclerae normal  HENT: ear canals and TM's normal, nose and mouth without ulcers or lesions  NECK: no adenopathy, no asymmetry, masses, or scars and thyroid normal to palpation  RESP: lungs clear to auscultation - no rales, rhonchi or wheezes  CV: regular rate and rhythm, normal S1 S2, no S3 or S4, no murmur, click " or rub, no peripheral edema and peripheral pulses strong  ABDOMEN: soft, nontender, no hepatosplenomegaly, no masses and bowel sounds normal  MS: no gross musculoskeletal defects noted, no edema  SKIN: no suspicious lesions or rashes  NEURO: Normal strength and tone, mentation intact and speech normal  PSYCH: mentation appears normal, affect anxious  LYMPH: no cervical adenopathy    Diagnostic Test Results:  Labs reviewed in Epic  Results for orders placed or performed in visit on 05/10/23 (from the past 24 hour(s))   Hemoglobin A1c   Result Value Ref Range    Hemoglobin A1C 5.3 0.0 - 5.6 %   Treponema Abs w Reflex to RPR and Titer   Result Value Ref Range    Treponema Antibody Total Nonreactive Nonreactive   HIV Antigen Antibody Combo   Result Value Ref Range    HIV Antigen Antibody Combo Nonreactive Nonreactive   Hepatitis C antibody   Result Value Ref Range    Hepatitis C Antibody Nonreactive Nonreactive    Narrative    Assay performance characteristics have not been established for newborns, infants, and children.       ASSESSMENT/PLAN:   Klever was seen today for physical.    Diagnoses and all orders for this visit:    Routine general medical examination at a health care facility  Lung cancer screening: not indicated - never smoker  Colon cancer screening: no prior screening; accepted FIT test   DEXA scan: not indicated by age  Diabetes screening: no prior screening; A1C of 5.3 today  Lipid screening: not due; last lipid panel 3/30/23 above desired range; 10 year ASCVD of 3.4%; discussed Mediterranean diet  PSA: not indicated by age; had   Asymptomatic STI screening: labs listed below; will contact with results once completed  -     Fecal colorectal cancer screen FIT  -     Hemoglobin A1c  -     Neisseria gonorrhoeae PCR  -     Chlamydia trachomatis PCR  -     Treponema Abs w Reflex to RPR and Titer  -     HIV Antigen Antibody Combo  -     Hepatitis C antibody    Erectile dysfunction, unspecified erectile  dysfunction type  Patient has difficulty obtaining and more so maintaining erection without any known cardiovascular, endocrine, or neurologic disorder. Does have some anxiety, but notes ED preceded that. Will trial sildenafil with follow up in 1-2 months; discussed potential side effects.  -     sildenafil (VIAGRA) 100 MG tablet; Take 0.5 tablets (50 mg) by mouth daily as needed (intercourse)    Palpitations  Has intermittent palpitations, sometimes with chest pain and dyspnea. Had evaluation with cardiology in past few weeks notable for normal exercise stress test and echo. He had questions regarding these tests, so reviewed in detail. No history of smoking, chronic cough, wheezing, or other exposure to suggest asthma/COPD. These symptoms do correlate with increased life stressors, suggesting possible anxiety induced. Recommended to follow up for this.      Patient has been advised of split billing requirements and indicates understanding: Yes      COUNSELING:   Reviewed preventive health counseling, as reflected in patient instructions       Regular exercise       Healthy diet/nutrition       Alcohol Use        Safe sex practices/STD prevention       Consider Hep C screening for all patients one time for ages 18-79 years       HIV screeninx in teen years, 1x in adult years, and at intervals if high risk       Colorectal cancer screening       Osteoporosis prevention/bone health         The 10-year ASCVD risk score (Zaynab MCKEE, et al., 2019) is: 3.4%    Values used to calculate the score:      Age: 53 years      Sex: Male      Is Non- : No      Diabetic: No      Tobacco smoker: No      Systolic Blood Pressure: 123 mmHg      Is BP treated: No      HDL Cholesterol: 69 mg/dL      Total Cholesterol: 208 mg/dL        He reports that he has never smoked. He quit smokeless tobacco use about 31 years ago.  His smokeless tobacco use included chew.      Patient staffed with attending provider   Erasto.    Josh Burden MD PGY3  [facility]

## 2023-05-11 PROBLEM — N52.9 ERECTILE DYSFUNCTION, UNSPECIFIED ERECTILE DYSFUNCTION TYPE: Status: ACTIVE | Noted: 2023-05-11

## 2023-05-11 PROBLEM — R00.2 PALPITATIONS: Status: ACTIVE | Noted: 2023-05-11

## 2023-05-11 LAB
C TRACH DNA SPEC QL NAA+PROBE: NEGATIVE
HCV AB SERPL QL IA: NONREACTIVE
HIV 1+2 AB+HIV1 P24 AG SERPL QL IA: NONREACTIVE
N GONORRHOEA DNA SPEC QL NAA+PROBE: NEGATIVE
T PALLIDUM AB SER QL: NONREACTIVE

## 2023-09-26 ENCOUNTER — TELEPHONE (OUTPATIENT)
Dept: CARDIOLOGY | Facility: CLINIC | Age: 53
End: 2023-09-26
Payer: COMMERCIAL

## 2023-09-26 DIAGNOSIS — R06.02 SHORTNESS OF BREATH ON EXERTION: Primary | ICD-10-CM

## 2023-09-26 DIAGNOSIS — R07.9 CHEST PAIN: ICD-10-CM

## 2023-09-26 DIAGNOSIS — R00.2 PALPITATIONS: ICD-10-CM

## 2023-09-26 NOTE — TELEPHONE ENCOUNTER
Attempted to return patient's call, unable to leave a voicemail as patient's voicemail is full.  Will send a Cell-A-Spot message with request to reply to the Vega-Chit message.  BRIAN Childers RN

## 2023-09-26 NOTE — TELEPHONE ENCOUNTER
Received call from patient stating that he has been woken up nearly every day for the past week or 2 feeling his heart skipping a beat.  Patient states he is jolted awake when this happens, states it lasts for about a split socket and then he is able to go back to sleep again.  Patient denies any chest pain or shortness of breath with this but states he has been getting some chest pain which she states comes and goes, calls it a sharp pain usually lasting 20 to 30 seconds while he is working out.  Patient states it goes away spontaneously, does not radiate to arms or jaw.  Patient states this happens about once a week denies any associated shortness of breath or nausea with it.  Will message Dr. Dilan Trotter to review.  BRIAN Childers RN

## 2023-09-26 NOTE — TELEPHONE ENCOUNTER
Would have the patient wear an event monitor for 3 weeks.  We will check a basic metabolic profile, magnesium and TSH.  Would also get a stress EKG to determine if there is any evidence of ischemia given the chest discomfort.  Sounds distinctly noncardiac that it only lasts for 20 to 30 seconds.  Thanks

## 2023-09-26 NOTE — TELEPHONE ENCOUNTER
"[facility] Call Center    Phone Message    May a detailed message be left on voicemail: yes     Reason for Call: Other: Edwin called requesting a call back from a member of his care team because he has some questions about whether or not he should schedule \"something\" but did not provide any more details. Please reach out to Edwin to discuss. Thank you!     Action Taken: Other: Cardiology    Travel Screening: Not Applicable    Thank you!  [facility]                                                                      "

## 2023-09-27 NOTE — TELEPHONE ENCOUNTER
Attempted to call pt with recommendations from Dr. Junior, left message for pt to call back. Savage RICHARDSON

## 2023-09-27 NOTE — TELEPHONE ENCOUNTER
Patient called back, informed of recommendations from Dr. Dilan Trotter.  Orders in chart and will message scheduling to call patient to arrange.  BRIAN Childers RN

## 2023-10-02 ENCOUNTER — LAB (OUTPATIENT)
Dept: LAB | Facility: CLINIC | Age: 53
End: 2023-10-02
Payer: COMMERCIAL

## 2023-10-02 ENCOUNTER — HOSPITAL ENCOUNTER (OUTPATIENT)
Dept: CARDIOLOGY | Facility: CLINIC | Age: 53
Discharge: HOME | End: 2023-10-02
Attending: INTERNAL MEDICINE | Admitting: INTERNAL MEDICINE
Payer: COMMERCIAL

## 2023-10-02 DIAGNOSIS — R06.02 SHORTNESS OF BREATH ON EXERTION: ICD-10-CM

## 2023-10-02 DIAGNOSIS — R07.9 CHEST PAIN: ICD-10-CM

## 2023-10-02 DIAGNOSIS — R00.2 PALPITATIONS: ICD-10-CM

## 2023-10-02 LAB
ANION GAP SERPL CALCULATED.3IONS-SCNC: 14 MMOL/L (ref 7–15)
BUN SERPL-MCNC: 6 MG/DL (ref 6–20)
CALCIUM SERPL-MCNC: 9.7 MG/DL (ref 8.6–10)
CHLORIDE SERPL-SCNC: 100 MMOL/L (ref 98–107)
CREAT SERPL-MCNC: 0.78 MG/DL (ref 0.67–1.17)
DEPRECATED HCO3 PLAS-SCNC: 24 MMOL/L (ref 22–29)
EGFRCR SERPLBLD CKD-EPI 2021: >90 ML/MIN/1.73M2
GLUCOSE SERPL-MCNC: 98 MG/DL (ref 70–99)
MAGNESIUM SERPL-MCNC: 2.1 MG/DL (ref 1.7–2.3)
POTASSIUM SERPL-SCNC: 4.1 MMOL/L (ref 3.4–5.3)
SODIUM SERPL-SCNC: 138 MMOL/L (ref 135–145)
TSH SERPL DL<=0.005 MIU/L-ACNC: 1.4 UIU/ML (ref 0.3–4.2)

## 2023-10-02 PROCEDURE — 80048 BASIC METABOLIC PNL TOTAL CA: CPT | Performed by: INTERNAL MEDICINE

## 2023-10-02 PROCEDURE — 83735 ASSAY OF MAGNESIUM: CPT | Performed by: INTERNAL MEDICINE

## 2023-10-02 PROCEDURE — 93272 ECG/REVIEW INTERPRET ONLY: CPT | Performed by: INTERNAL MEDICINE

## 2023-10-02 PROCEDURE — 84443 ASSAY THYROID STIM HORMONE: CPT | Performed by: INTERNAL MEDICINE

## 2023-10-02 PROCEDURE — 36415 COLL VENOUS BLD VENIPUNCTURE: CPT | Performed by: INTERNAL MEDICINE

## 2023-10-02 PROCEDURE — 93270 REMOTE 30 DAY ECG REV/REPORT: CPT

## 2023-10-03 ENCOUNTER — HOSPITAL ENCOUNTER (OUTPATIENT)
Dept: CARDIOLOGY | Facility: CLINIC | Age: 53
Discharge: HOME | End: 2023-10-03
Attending: INTERNAL MEDICINE | Admitting: INTERNAL MEDICINE
Payer: COMMERCIAL

## 2023-10-03 ENCOUNTER — TELEPHONE (OUTPATIENT)
Dept: CARDIOLOGY | Facility: CLINIC | Age: 53
End: 2023-10-03

## 2023-10-03 DIAGNOSIS — R07.9 CHEST PAIN: ICD-10-CM

## 2023-10-03 DIAGNOSIS — R06.02 SHORTNESS OF BREATH ON EXERTION: ICD-10-CM

## 2023-10-03 DIAGNOSIS — R00.2 PALPITATIONS: ICD-10-CM

## 2023-10-03 PROCEDURE — 93017 CV STRESS TEST TRACING ONLY: CPT

## 2023-10-03 PROCEDURE — 93018 CV STRESS TEST I&R ONLY: CPT | Performed by: INTERNAL MEDICINE

## 2023-10-03 PROCEDURE — 93016 CV STRESS TEST SUPVJ ONLY: CPT | Performed by: INTERNAL MEDICINE

## 2023-10-03 NOTE — TELEPHONE ENCOUNTER
Results noted. Negative stress test. Heart monitor results still pending. RN will send update on stress test to Dr. Junior for review.                      10/3/23 stress test  Interpretation Summary     This was an exercise ECG stress test, no images were submitted for review. The  indication for the test was dyspnea on exertion, chest pain, palpitations.     Patient exercised for 12 minutes on the Beto protocol. Patient achieved a  maximum heart rate of 169 BPM, corresponding to 13.4 METS and representing 101  % of the maximum predicted heart rate. Heart rate and blood pressure response  to exercise were normal. No desaturation was noted during the study. The study  was terminated due to patient request for fatigue and target heart rate  achieved.     Baseline ECG shows normal sinus rhythm.  Stress ECG negative for myocardial ischemia.  No arrhythmia was exercise.

## 2023-11-07 NOTE — TELEPHONE ENCOUNTER
Called patient with event monitor results and recommendations from Dr. Dilan Trotter.  BRIAN Childers RN

## 2024-01-25 ENCOUNTER — MYC REFILL (OUTPATIENT)
Dept: FAMILY MEDICINE | Facility: CLINIC | Age: 54
End: 2024-01-25
Payer: COMMERCIAL

## 2024-01-25 DIAGNOSIS — N52.9 ERECTILE DYSFUNCTION, UNSPECIFIED ERECTILE DYSFUNCTION TYPE: ICD-10-CM

## 2024-01-25 DIAGNOSIS — Z00.00 ROUTINE GENERAL MEDICAL EXAMINATION AT A HEALTH CARE FACILITY: ICD-10-CM

## 2024-01-26 RX ORDER — SILDENAFIL 100 MG/1
50 TABLET, FILM COATED ORAL DAILY PRN
Qty: 30 TABLET | Refills: 1 | Status: SHIPPED | OUTPATIENT
Start: 2024-01-26 | End: 2024-06-04

## 2024-01-26 NOTE — TELEPHONE ENCOUNTER
Medication requested: sildenafil (VIAGRA) 100 MG tablet   Last office visit: 5/10/23  Future [facility] appointments: none  Medication last refilled: 5/10/23  Last qualifying labs: none    Prescription approved per [facility] Refill Protocol.    Erectile Dysfuction Protocol [id number]/25/2024 11:41 AM   Protocol Details Absence of nitrates on medication list    Absence of Alpha Blockers on Med list    Recent (12 mo) or future (30 days) visit within the authorizing provider's specialty    Medication is active on med list    Patient is age 18 or older        Gretchen RN, BSN

## 2024-04-19 ENCOUNTER — OFFICE VISIT (OUTPATIENT)
Dept: OPHTHALMOLOGY | Facility: CLINIC | Age: 54
End: 2024-04-19
Attending: OPHTHALMOLOGY
Payer: COMMERCIAL

## 2024-04-19 DIAGNOSIS — H52.13 MYOPIA OF BOTH EYES: ICD-10-CM

## 2024-04-19 DIAGNOSIS — H43.812 PVD (POSTERIOR VITREOUS DETACHMENT), LEFT EYE: Primary | ICD-10-CM

## 2024-04-19 PROCEDURE — G0463 HOSPITAL OUTPT CLINIC VISIT: HCPCS | Performed by: OPHTHALMOLOGY

## 2024-04-19 PROCEDURE — 92025 CPTRIZED CORNEAL TOPOGRAPHY: CPT | Performed by: OPHTHALMOLOGY

## 2024-04-19 PROCEDURE — 99203 OFFICE O/P NEW LOW 30 MIN: CPT | Performed by: OPHTHALMOLOGY

## 2024-04-19 ASSESSMENT — REFRACTION_MANIFEST
OD_AXIS: 010
OS_CYLINDER: +0.25
OD_CYLINDER: +0.75
OD_ADD: +2.00
OS_SPHERE: -0.50
OS_AXIS: 135
OD_SPHERE: -9.00
OS_ADD: +2.00

## 2024-04-19 ASSESSMENT — EXTERNAL EXAM - RIGHT EYE: OD_EXAM: NORMAL

## 2024-04-19 ASSESSMENT — CONF VISUAL FIELD
OS_INFERIOR_TEMPORAL_RESTRICTION: 0
OD_INFERIOR_TEMPORAL_RESTRICTION: 0
OD_SUPERIOR_NASAL_RESTRICTION: 0
OS_SUPERIOR_NASAL_RESTRICTION: 0
OS_INFERIOR_NASAL_RESTRICTION: 0
OS_SUPERIOR_TEMPORAL_RESTRICTION: 0
OD_INFERIOR_NASAL_RESTRICTION: 0
METHOD: COUNTING FINGERS
OD_SUPERIOR_TEMPORAL_RESTRICTION: 0
OS_NORMAL: 1
OD_NORMAL: 1

## 2024-04-19 ASSESSMENT — VISUAL ACUITY
OS_SC: 20/25
CORRECTION_TYPE: CONTACTS
OD_CC: 20/20
OD_CC+: -1
METHOD: SNELLEN - LINEAR
METHOD_MR: DIAGNOSTIC REFRACTION
OS_SC+: -2

## 2024-04-19 ASSESSMENT — SLIT LAMP EXAM - LIDS
COMMENTS: NORMAL
COMMENTS: NORMAL

## 2024-04-19 ASSESSMENT — TONOMETRY
IOP_METHOD: ICARE
OS_IOP_MMHG: 19
OD_IOP_MMHG: 24

## 2024-04-19 ASSESSMENT — CUP TO DISC RATIO
OD_RATIO: 0.2
OS_RATIO: 0.2

## 2024-04-19 ASSESSMENT — EXTERNAL EXAM - LEFT EYE: OS_EXAM: NORMAL

## 2024-04-19 NOTE — PROGRESS NOTES
Chief complaint   Routine exam    HPI    Klever Varmarich 54 year old male       Chief Complaint(s) and History of Present Illness(es)       Second Opinion               Comments    Klever is here self referred for a second opinion after laser surgery. He has had PRK in left eye on December of 2022. Today he states intermittent pain in left eye with increased floaters and occasional fog around floaters. He has a contact in right eye    Rich Rivas COT 9:42 AM April 19, 2024                      Past ocular history   Prior eye surgery/laser/Trauma: PRK left eye December 2022  CTL wearer:No  Glasses : -  Family Hx of eye disease: -    PMH     Past Medical History:   Diagnosis Date    Microscopic hematuria 7/2/2014       PSH     Past Surgical History:   Procedure Laterality Date    LASIK         Meds     Current Outpatient Medications   Medication Sig Dispense Refill    sildenafil (VIAGRA) 100 MG tablet Take 0.5 tablets (50 mg) by mouth daily as needed (intercourse) 30 tablet 1     No current facility-administered medications for this visit.       Labs   -    Imaging   -    Drops Currently Taking   -    Assessment/Plan 04/19/2024   #PVD left eye  No tear or detachment detected  Discussed risks of detachment and advised to seek care if new symptoms appear     # myopia right eye>left eye  High myopic   Residual refraction left eye  DD ectasia vs progressive myopia  S/p PRK left eye  Gregory is normal without signs of ectasia  reassurance    Follow up:  Oph: yearly exam      Attending Physician Attestation:  Complete documentation of historical and exam elements from today's encounter can be found in the full encounter summary report (not reduplicated in this progress note).  I personally obtained the chief complaint(s) and history of present illness.  I confirmed and edited as necessary the review of systems, past medical/surgical history, family history, social history, and examination findings as documented by others;  and I examined the patient myself.  I personally reviewed the relevant tests, images, and reports as documented above.  I formulated and edited as necessary the assessment and plan and discussed the findings and management plan with the patient and family. - Darío Salinas MD

## 2024-04-19 NOTE — NURSING NOTE
Chief Complaints and History of Present Illnesses   Patient presents with    Second Opinion     Chief Complaint(s) and History of Present Illness(es)       Second Opinion               Comments    Klever is here self referred for a second opinion after laser surgery. He has had PRK in left eye on December of 2022. Today he states intermittent pain in left eye with increased floaters and occasional fog around floaters.     Rich Rivas COT 9:42 AM April 19, 2024

## 2024-06-04 ENCOUNTER — MYC REFILL (OUTPATIENT)
Dept: FAMILY MEDICINE | Facility: CLINIC | Age: 54
End: 2024-06-04
Payer: COMMERCIAL

## 2024-06-04 DIAGNOSIS — Z00.00 ROUTINE GENERAL MEDICAL EXAMINATION AT A HEALTH CARE FACILITY: ICD-10-CM

## 2024-06-04 DIAGNOSIS — N52.9 ERECTILE DYSFUNCTION, UNSPECIFIED ERECTILE DYSFUNCTION TYPE: ICD-10-CM

## 2024-06-04 RX ORDER — SILDENAFIL 100 MG/1
50 TABLET, FILM COATED ORAL DAILY PRN
Qty: 30 TABLET | Refills: 0 | Status: SHIPPED | OUTPATIENT
Start: 2024-06-04 | End: 2024-09-20

## 2024-06-04 NOTE — TELEPHONE ENCOUNTER
sildenafil (VIAGRA) 100 MG tablet         Sig: Take 0.5 tablets (50 mg) by mouth daily as needed (intercourse)    Disp: 30 tablet    Refills: 1    Start: 6/4/2024    Class: E-Prescribe    Non-formulary For: Routine general medical examination at a health care facility; Erectile dysfunction, unspecified erectile dysfunction type    Last ordered: 4 months ago (1/26/2024) by Janee Bruno MD    Erectile Dysfuction Protocol [id number]/04/2024 11:09 AM   Protocol Details Recent (12 mo) or future (30 days) visit within the authorizing provider's specialty        DYLAN Godinez, BSN

## 2024-06-04 NOTE — TELEPHONE ENCOUNTER
Medication refilled. Will reach out to patient to schedule annual wellness examination.    Lisa Byrd MD, PGY3  [facility]

## 2024-08-17 ENCOUNTER — HEALTH MAINTENANCE LETTER (OUTPATIENT)
Age: 54
End: 2024-08-17

## 2024-09-23 ENCOUNTER — TELEPHONE (OUTPATIENT)
Dept: FAMILY MEDICINE | Facility: CLINIC | Age: 54
End: 2024-09-23
Payer: COMMERCIAL

## 2024-09-23 NOTE — TELEPHONE ENCOUNTER
Copying 's note for work purpose:  Brandon Rodriguez, could you call patient to schedule a preventive visit with me? Thank you!     Called, talked to patient.  Patient will call us back to schedule for cpe.    Michael Nation MA

## 2024-10-01 DIAGNOSIS — N52.9 ERECTILE DYSFUNCTION, UNSPECIFIED ERECTILE DYSFUNCTION TYPE: ICD-10-CM

## 2024-10-01 DIAGNOSIS — Z00.00 ROUTINE GENERAL MEDICAL EXAMINATION AT A HEALTH CARE FACILITY: ICD-10-CM

## 2024-10-01 RX ORDER — SILDENAFIL 100 MG/1
50 TABLET, FILM COATED ORAL DAILY PRN
Qty: 15 TABLET | Refills: 0 | Status: SHIPPED | OUTPATIENT
Start: 2024-10-01

## 2024-10-01 NOTE — TELEPHONE ENCOUNTER
Medication Question or Refill        What medication are you calling about (include dose and sig)?: sildenafil 100mg    Preferred Pharmacy:     "[id number] - [place], [place] - [address] [place] [address]  Phone: [phone] Fax: [phone]      Controlled Substance Agreement on file:   CSA -- Patient Level:    CSA: None found at the patient level.       Who prescribed the medication?:     Do you need a refill? Yes    When did you use the medication last? A couple of days ago    Patient offered an appointment? No    Do you have any questions or concerns?  Yes: just needs a refill      Could we send this information to you in [employer] or would you prefer to receive a phone call?:   Patient would prefer a phone call   Okay to leave a detailed message?: Yes at Home number on file [phone] (home)    Does patient or caller know when to expect a call? Yes, PCS will return call within 24 business hours.       George Wiley on 10/1/2024 at 10:24 AM

## 2024-10-01 NOTE — TELEPHONE ENCOUNTER
sildenafil (VIAGRA) 100 MG tablet          Possible duplicate: Americo to review recent actions on this medication    Sig: Take 0.5 tablets (50 mg) by mouth daily as needed (intercourse).    Disp: 15 tablet    Refills: 0    Start: 10/1/2024    Class: E-Prescribe    Non-formulary For: Routine general medical examination at a health care facility; Erectile dysfunction, unspecified erectile dysfunction type    Last ordered: 1 week ago (9/23/2024) by Balwinder Machuca MD    Erectile Dysfuction Protocol Bjxvhh39/01/2024 02:08 PM   Protocol Details Absence of nitrates on medication list    Absence of Alpha Blockers on Med list    Medication is active on med list    Recent (12 mo) or future (90 days) visit within the authorizing provider's specialty    Medicatin indicated for associated diagnosis    Patient is age 18 or older        Prescription approved per [facility] Refill Protocol.  Gretchen RN, BSN

## 2024-10-21 ENCOUNTER — OFFICE VISIT (OUTPATIENT)
Dept: FAMILY MEDICINE | Facility: CLINIC | Age: 54
End: 2024-10-21
Payer: COMMERCIAL

## 2024-10-21 VITALS
SYSTOLIC BLOOD PRESSURE: 127 MMHG | RESPIRATION RATE: 20 BRPM | WEIGHT: 173.6 LBS | DIASTOLIC BLOOD PRESSURE: 74 MMHG | HEART RATE: 69 BPM | HEIGHT: 70 IN | BODY MASS INDEX: 24.85 KG/M2 | TEMPERATURE: 98.2 F | OXYGEN SATURATION: 94 %

## 2024-10-21 DIAGNOSIS — N52.9 ERECTILE DYSFUNCTION, UNSPECIFIED ERECTILE DYSFUNCTION TYPE: ICD-10-CM

## 2024-10-21 DIAGNOSIS — Z12.11 SCREEN FOR COLON CANCER: ICD-10-CM

## 2024-10-21 DIAGNOSIS — Z00.00 ROUTINE GENERAL MEDICAL EXAMINATION AT A HEALTH CARE FACILITY: Primary | ICD-10-CM

## 2024-10-21 PROCEDURE — 91320 SARSCV2 VAC 30MCG TRS-SUC IM: CPT

## 2024-10-21 PROCEDURE — 90471 IMMUNIZATION ADMIN: CPT

## 2024-10-21 PROCEDURE — 99396 PREV VISIT EST AGE 40-64: CPT | Mod: 25

## 2024-10-21 PROCEDURE — 90472 IMMUNIZATION ADMIN EACH ADD: CPT

## 2024-10-21 PROCEDURE — 90656 IIV3 VACC NO PRSV 0.5 ML IM: CPT

## 2024-10-21 PROCEDURE — 90480 ADMN SARSCOV2 VAC 1/ONLY CMP: CPT

## 2024-10-21 PROCEDURE — 90715 TDAP VACCINE 7 YRS/> IM: CPT

## 2024-10-21 RX ORDER — SILDENAFIL 100 MG/1
50 TABLET, FILM COATED ORAL DAILY PRN
Qty: 90 TABLET | Refills: 0 | Status: SHIPPED | OUTPATIENT
Start: 2024-10-21 | End: 2024-11-08

## 2024-10-21 SDOH — HEALTH STABILITY: PHYSICAL HEALTH: ON AVERAGE, HOW MANY MINUTES DO YOU ENGAGE IN EXERCISE AT THIS LEVEL?: 20 MIN

## 2024-10-21 SDOH — HEALTH STABILITY: PHYSICAL HEALTH: ON AVERAGE, HOW MANY DAYS PER WEEK DO YOU ENGAGE IN MODERATE TO STRENUOUS EXERCISE (LIKE A BRISK WALK)?: 3 DAYS

## 2024-10-21 ASSESSMENT — ANXIETY QUESTIONNAIRES
8. IF YOU CHECKED OFF ANY PROBLEMS, HOW DIFFICULT HAVE THESE MADE IT FOR YOU TO DO YOUR WORK, TAKE CARE OF THINGS AT HOME, OR GET ALONG WITH OTHER PEOPLE?: NOT DIFFICULT AT ALL
3. WORRYING TOO MUCH ABOUT DIFFERENT THINGS: NOT AT ALL
4. TROUBLE RELAXING: NOT AT ALL
GAD7 TOTAL SCORE: 0
IF YOU CHECKED OFF ANY PROBLEMS ON THIS QUESTIONNAIRE, HOW DIFFICULT HAVE THESE PROBLEMS MADE IT FOR YOU TO DO YOUR WORK, TAKE CARE OF THINGS AT HOME, OR GET ALONG WITH OTHER PEOPLE: NOT DIFFICULT AT ALL
7. FEELING AFRAID AS IF SOMETHING AWFUL MIGHT HAPPEN: NOT AT ALL
7. FEELING AFRAID AS IF SOMETHING AWFUL MIGHT HAPPEN: NOT AT ALL
1. FEELING NERVOUS, ANXIOUS, OR ON EDGE: NOT AT ALL
GAD7 TOTAL SCORE: 0
GAD7 TOTAL SCORE: 0
6. BECOMING EASILY ANNOYED OR IRRITABLE: NOT AT ALL
2. NOT BEING ABLE TO STOP OR CONTROL WORRYING: NOT AT ALL
5. BEING SO RESTLESS THAT IT IS HARD TO SIT STILL: NOT AT ALL

## 2024-10-21 ASSESSMENT — PATIENT HEALTH QUESTIONNAIRE - PHQ9
10. IF YOU CHECKED OFF ANY PROBLEMS, HOW DIFFICULT HAVE THESE PROBLEMS MADE IT FOR YOU TO DO YOUR WORK, TAKE CARE OF THINGS AT HOME, OR GET ALONG WITH OTHER PEOPLE: NOT DIFFICULT AT ALL
SUM OF ALL RESPONSES TO PHQ QUESTIONS 1-9: 0
SUM OF ALL RESPONSES TO PHQ QUESTIONS 1-9: 0

## 2024-10-21 ASSESSMENT — SOCIAL DETERMINANTS OF HEALTH (SDOH): HOW OFTEN DO YOU GET TOGETHER WITH FRIENDS OR RELATIVES?: MORE THAN THREE TIMES A WEEK

## 2024-10-21 NOTE — PATIENT INSTRUCTIONS
Nice to see you today!    Here's what we talked about:  - Complete the Cologuard at home. Call us when you get the results back.  - Let us know if you find your hepatitis B vaccine records so we can update our records.  - Go to the pharmacy to get your shingles shots.  - I've refilled your Viagra.  - Bring back your forms once you've completed and notarized them.    Patient Education   Preventive Care Advice   This is general advice given by our system to help you stay healthy. However, your care team may have specific advice just for you. Please talk to your care team about your preventive care needs.  Nutrition  Eat 5 or more servings of fruits and vegetables each day.  Try wheat bread, brown rice and whole grain pasta (instead of white bread, rice, and pasta).  Get enough calcium and vitamin D. Check the label on foods and aim for 100% of the RDA (recommended daily allowance).  Lifestyle  Exercise at least 150 minutes each week  (30 minutes a day, 5 days a week).  Do muscle strengthening activities 2 days a week. These help control your weight and prevent disease.  No smoking.  Wear sunscreen to prevent skin cancer.  Have a dental exam and cleaning every 6 months.  Yearly exams  See your health care team every year to talk about:  Any changes in your health.  Any medicines your care team has prescribed.  Preventive care, family planning, and ways to prevent chronic diseases.  Shots (vaccines)   HPV shots (up to age 26), if you've never had them before.  Hepatitis B shots (up to age 59), if you've never had them before.  COVID-19 shot: Get this shot when it's due.  Flu shot: Get a flu shot every year.  Tetanus shot: Get a tetanus shot every 10 years.  Pneumococcal, hepatitis A, and RSV shots: Ask your care team if you need these based on your risk.  Shingles shot (for age 50 and up)  General health tests  Diabetes screening:  Starting at age 35, Get screened for diabetes at least every 3 years.  If you are younger  than age 35, ask your care team if you should be screened for diabetes.  Cholesterol test: At age 39, start having a cholesterol test every 5 years, or more often if advised.  Bone density scan (DEXA): At age 50, ask your care team if you should have this scan for osteoporosis (brittle bones).  Hepatitis C: Get tested at least once in your life.  STIs (sexually transmitted infections)  Before age 24: Ask your care team if you should be screened for STIs.  After age 24: Get screened for STIs if you're at risk. You are at risk for STIs (including HIV) if:  You are sexually active with more than one person.  You don't use condoms every time.  You or a partner was diagnosed with a sexually transmitted infection.  If you are at risk for HIV, ask about PrEP medicine to prevent HIV.  Get tested for HIV at least once in your life, whether you are at risk for HIV or not.  Cancer screening tests  Cervical cancer screening: If you have a cervix, begin getting regular cervical cancer screening tests starting at age 21.  Breast cancer scan (mammogram): If you've ever had breasts, begin having regular mammograms starting at age 40. This is a scan to check for breast cancer.  Colon cancer screening: It is important to start screening for colon cancer at age 45.  Have a colonoscopy test every 10 years (or more often if you're at risk) Or, ask your provider about stool tests like a FIT test every year or Cologuard test every 3 years.  To learn more about your testing options, visit:   .  For help making a decision, visit:   https://bit.ly/si46965.  Prostate cancer screening test: If you have a prostate, ask your care team if a prostate cancer screening test (PSA) at age 55 is right for you.  Lung cancer screening: If you are a current or former smoker ages 50 to 80, ask your care team if ongoing lung cancer screenings are right for you.  For informational purposes only. Not to replace the advice of your health care provider.  Copyright   2023 [facility]. All rights reserved. Clinically reviewed by the [facility]itions [facility]. [facility] 475018 - REV 01/24.

## 2024-10-21 NOTE — PROGRESS NOTES
Preceptor Attestation:    I discussed the patient with the resident and evaluated the patient in person. I have verified the content of the note, which accurately reflects my assessment of the patient and the plan of care.   Supervising Physician:  Iam Barraza MD.

## 2024-10-21 NOTE — PROGRESS NOTES
Preventive Care Visit  [facility]  Janee Bruno MD, Family Medicine  Oct 21, 2024    Assessment & Plan     Routine general medical examination at a health care facility  Doing well.  ACP documents provided.  ASCVD risk score below threshold to start statin.  Glucose last year was normal.  Does not need to recheck this year.  Updating shots today.  Patient may have gotten additional hep B shots in adulthood; discussed bringing in records to verify before updating shots further.  Discussed regular dental care and healthy lifestyle habits.  - TDAP VACCINE (Adacel, Boostrix)  [[id number]]  - COVID-19 12+ (PFIZER)  - INFLUENZA VACCINE, SPLIT VIRUS, TRIVALENT,PF (FLUZONE\FLUARIX)    Screen for colon cancer  Has Cologuard at home, has not completed yet but discussed completing it soon.    Erectile dysfunction, unspecified erectile dysfunction type  Sildenafil 50 mg daily as needed has been working well.  This was refilled.  - sildenafil (VIAGRA) 100 MG tablet  Dispense: 90 tablet; Refill: 0      Counseling  Appropriate preventive services were addressed with this patient via screening, questionnaire, or discussion as appropriate for fall prevention, nutrition, physical activity, Tobacco-use cessation, social engagement, weight loss and cognition.  Checklist reviewing preventive services available has been given to the patient.  Reviewed patient's diet, addressing concerns and/or questions.   He is at risk for lack of exercise and has been provided with information to increase physical activity for the benefit of his well-being.   The patient was instructed to see the dentist every 6 months.   He is at risk for psychosocial distress and has been provided with information to reduce risk.     Return in about 1 year (around 10/22/2025) for Preventive Visit.    Chris Ernst is a 54 year old, presenting for the following:  Physical (cpe)        10/21/2024     1:14 PM   Additional Questions   Roomed by  gabrielle   Accompanied by self         10/21/2024    Information    services provided? No           Health Care Directive  Patient does not have a Health Care Directive or Living Will: Discussed advance care planning with patient; information given to patient to review.    HPI  No concerns.    Has heart palpitations, unchanged since last year. No associated symptoms. Most recent stress test in 10/2023 was negative, heart monitor showed PAC's but no arrhythmia with skipped beat sensations.    Patient was prescribed Viagra last year for ED; per chart review; no known cardiovascular, endocrine, or neurologic disorder, and anxiety preceded ED. Patient states he has had ED off and on for the past 10-15 years. Sildenafil has been working well. No issues with arousal, ejaculation, or maintaining erection, or prolonged erection.          10/21/2024   General Health   How would you rate your overall physical health? Excellent   Feel stress (tense, anxious, or unable to sleep) Only a little      (!) STRESS CONCERN      10/21/2024   Nutrition   Three or more servings of calcium each day? Yes   Diet: Regular (no restrictions)   How many servings of fruit and vegetables per day? (!) 2-3   How many sweetened beverages each day? 0-1            10/21/2024   Exercise   Days per week of moderate/strenous exercise 3 days   Average minutes spent exercising at this level 20 min            10/21/2024   Social Factors   Frequency of gathering with friends or relatives More than three times a week   Worry food won't last until get money to buy more No   Food not last or not have enough money for food? No   Do you have housing? (Housing is defined as stable permanent housing and does not include staying ouside in a car, in a tent, in an abandoned building, in an overnight shelter, or couch-surfing.) Yes   Are you worried about losing your housing? No   Lack of transportation? No   Unable to get utilities  "(heat,electricity)? No            10/21/2024   Fall Risk   Fallen 2 or more times in the past year? No   Trouble with walking or balance? No             10/21/2024   Dental   Dentist two times every year? (!) NO            10/21/2024   TB Screening   Were you born outside of the [place]? No          Today's PHQ-9 Score:       10/21/2024    11:49 AM   PHQ-9 SCORE   PHQ-9 Total Score MyChart 0   PHQ-9 Total Score 0         10/21/2024   Substance Use   Alcohol more than 3/day or more than 7/wk No   Do you use any other substances recreationally? No        Social History     Tobacco Use    Smoking status: Never    Smokeless tobacco: Former     Types: Chew     Quit date: 1992   Substance Use Topics    Alcohol use: Yes     Comment: occ           10/21/2024   STI Screening   New sexual partner(s) since last STI/HIV test? No      ASCVD Risk   The 10-year ASCVD risk score (Zaynab MCKEE, et al., 2019) is: 3.9%    Values used to calculate the score:      Age: 54 years      Sex: Male      Is Non- : No      Diabetic: No      Tobacco smoker: No      Systolic Blood Pressure: 127 mmHg      Is BP treated: No      HDL Cholesterol: 69 mg/dL      Total Cholesterol: 208 mg/dL    Reviewed and updated as needed this visit by Provider   Tobacco  Allergies  Meds  Problems  Med Hx  Surg Hx  Fam Hx            Pertinent positives and negatives as noted in HPI.     Objective    Exam  /74 (BP Location: Left arm, Patient Position: Sitting, Cuff Size: Adult Regular)   Pulse 69   Temp 98.2  F (36.8  C) (Oral)   Resp 20   Ht 1.778 m (5' 10\")   Wt 78.7 kg (173 lb 9.6 oz)   SpO2 94%   BMI 24.91 kg/m     Estimated body mass index is 24.91 kg/m  as calculated from the following:    Height as of this encounter: 1.778 m (5' 10\").    Weight as of this encounter: 78.7 kg (173 lb 9.6 oz).    Physical Exam  GENERAL: alert and no distress  EYES: Eyes grossly normal to inspection, PERRL and EOMI and conjunctivae and " sclerae normal  HENT: ear canals and TM's normal, nose and mouth without ulcers or lesions  NECK: no adenopathy, no asymmetry, masses, or scars  RESP: lungs clear to auscultation - no rales, rhonchi or wheezes  CV: regular rate and rhythm, normal S1 S2, no S3 or S4, no murmur, click or rub, no peripheral edema  ABDOMEN: soft, nontender, no hepatosplenomegaly, no masses and bowel sounds normal  MS: no gross musculoskeletal defects noted, no edema  SKIN: no suspicious lesions or rashes  NEURO: Normal strength and tone, mentation intact and speech normal  PSYCH: mentation appears normal, affect normal/bright    Patient precepted with Iam Barraza MD.    Signed Electronically by: Janee Bruno MD    Answers submitted by the patient for this visit:  Patient Health Questionnaire (Submitted on 10/21/2024)  If you checked off any problems, how difficult have these problems made it for you to do your work, take care of things at home, or get along with other people?: Not difficult at all  PHQ9 TOTAL SCORE: 0  Patient Health Questionnaire (G7) (Submitted on 10/21/2024)  JOANIE 7 TOTAL SCORE: 0

## 2024-11-08 ENCOUNTER — MYC REFILL (OUTPATIENT)
Dept: FAMILY MEDICINE | Facility: CLINIC | Age: 54
End: 2024-11-08
Payer: COMMERCIAL

## 2024-11-08 DIAGNOSIS — N52.9 ERECTILE DYSFUNCTION, UNSPECIFIED ERECTILE DYSFUNCTION TYPE: ICD-10-CM

## 2024-11-08 RX ORDER — SILDENAFIL 100 MG/1
50 TABLET, FILM COATED ORAL DAILY PRN
Qty: 90 TABLET | Refills: 0 | Status: SHIPPED | OUTPATIENT
Start: 2024-11-08

## 2024-11-08 NOTE — TELEPHONE ENCOUNTER
sildenafil (VIAGRA) 100 MG tablet          Sig: Take 0.5 tablets (50 mg) by mouth daily as needed (intercourse).    Disp: 90 tablet    Refills: 0    Start: 11/8/2024    Class: E-Prescribe    Non-formulary For: Erectile dysfunction, unspecified erectile dysfunction type    Last ordered: 2 weeks ago (10/21/2024) by Iam Barraza MD    Erectile Dysfuction Protocol [id number]/08/2024 12:51 PM   Protocol Details Absence of nitrates on medication list    Absence of Alpha Blockers on Med list    Medication is active on med list    Recent (12 mo) or future (90 days) visit within the authorizing provider's specialty    Medication indicated for associated diagnosis    Patient is age 18 or older          Prescription approved per [facility] Refill Protocol.    Viral Recio RN, MSN

## 2025-08-14 ENCOUNTER — MYC REFILL (OUTPATIENT)
Dept: FAMILY MEDICINE | Facility: CLINIC | Age: 55
End: 2025-08-14
Payer: COMMERCIAL

## 2025-08-14 DIAGNOSIS — N52.9 ERECTILE DYSFUNCTION, UNSPECIFIED ERECTILE DYSFUNCTION TYPE: ICD-10-CM

## 2025-08-14 RX ORDER — SILDENAFIL 100 MG/1
50 TABLET, FILM COATED ORAL DAILY PRN
Qty: 90 TABLET | Refills: 0 | Status: SHIPPED | OUTPATIENT
Start: 2025-08-14